# Patient Record
Sex: FEMALE | Race: WHITE | NOT HISPANIC OR LATINO | Employment: UNEMPLOYED | URBAN - NONMETROPOLITAN AREA
[De-identification: names, ages, dates, MRNs, and addresses within clinical notes are randomized per-mention and may not be internally consistent; named-entity substitution may affect disease eponyms.]

---

## 2024-09-12 ENCOUNTER — HOSPITAL ENCOUNTER (EMERGENCY)
Facility: HOSPITAL | Age: 29
Discharge: HOME | End: 2024-09-13
Attending: EMERGENCY MEDICINE
Payer: MEDICAID

## 2024-09-12 DIAGNOSIS — F10.920 ALCOHOLIC INTOXICATION WITHOUT COMPLICATION: Primary | ICD-10-CM

## 2024-09-12 LAB
AMPHET+METHAMPHET UR QL: POSITIVE
AMPHETAMINES UR QL: NEGATIVE
B-HCG UR QL: NEGATIVE
BARBITURATES UR QL SCN: NEGATIVE
BASOPHILS # BLD AUTO: 0.09 10*3/MM3 (ref 0–0.2)
BASOPHILS NFR BLD AUTO: 1.1 % (ref 0–1.5)
BENZODIAZ UR QL SCN: POSITIVE
BILIRUB UR QL STRIP: NEGATIVE
BUPRENORPHINE SERPL-MCNC: NEGATIVE NG/ML
CANNABINOIDS SERPL QL: NEGATIVE
CLARITY UR: CLEAR
COCAINE UR QL: NEGATIVE
COLOR UR: YELLOW
DEPRECATED RDW RBC AUTO: 56.8 FL (ref 37–54)
EOSINOPHIL # BLD AUTO: 0.1 10*3/MM3 (ref 0–0.4)
EOSINOPHIL NFR BLD AUTO: 1.2 % (ref 0.3–6.2)
ERYTHROCYTE [DISTWIDTH] IN BLOOD BY AUTOMATED COUNT: 14.7 % (ref 12.3–15.4)
GLUCOSE UR STRIP-MCNC: NEGATIVE MG/DL
HCT VFR BLD AUTO: 40.7 % (ref 34–46.6)
HGB BLD-MCNC: 13.5 G/DL (ref 12–15.9)
HGB UR QL STRIP.AUTO: NEGATIVE
IMM GRANULOCYTES # BLD AUTO: 0.02 10*3/MM3 (ref 0–0.05)
IMM GRANULOCYTES NFR BLD AUTO: 0.2 % (ref 0–0.5)
KETONES UR QL STRIP: NEGATIVE
LEUKOCYTE ESTERASE UR QL STRIP.AUTO: ABNORMAL
LYMPHOCYTES # BLD AUTO: 3.37 10*3/MM3 (ref 0.7–3.1)
LYMPHOCYTES NFR BLD AUTO: 40.2 % (ref 19.6–45.3)
MCH RBC QN AUTO: 34.4 PG (ref 26.6–33)
MCHC RBC AUTO-ENTMCNC: 33.2 G/DL (ref 31.5–35.7)
MCV RBC AUTO: 103.8 FL (ref 79–97)
METHADONE UR QL SCN: NEGATIVE
MONOCYTES # BLD AUTO: 0.64 10*3/MM3 (ref 0.1–0.9)
MONOCYTES NFR BLD AUTO: 7.6 % (ref 5–12)
NEUTROPHILS NFR BLD AUTO: 4.16 10*3/MM3 (ref 1.7–7)
NEUTROPHILS NFR BLD AUTO: 49.7 % (ref 42.7–76)
NITRITE UR QL STRIP: NEGATIVE
NRBC BLD AUTO-RTO: 0 /100 WBC (ref 0–0.2)
OPIATES UR QL: NEGATIVE
OXYCODONE UR QL SCN: NEGATIVE
PCP UR QL SCN: NEGATIVE
PH UR STRIP.AUTO: 6.5 [PH] (ref 5–8)
PLATELET # BLD AUTO: 442 10*3/MM3 (ref 140–450)
PMV BLD AUTO: 10 FL (ref 6–12)
PROT UR QL STRIP: NEGATIVE
RBC # BLD AUTO: 3.92 10*6/MM3 (ref 3.77–5.28)
SP GR UR STRIP: <=1.005 (ref 1–1.03)
TRICYCLICS UR QL SCN: NEGATIVE
UROBILINOGEN UR QL STRIP: ABNORMAL
WBC NRBC COR # BLD AUTO: 8.38 10*3/MM3 (ref 3.4–10.8)

## 2024-09-12 PROCEDURE — 81001 URINALYSIS AUTO W/SCOPE: CPT | Performed by: EMERGENCY MEDICINE

## 2024-09-12 PROCEDURE — 36415 COLL VENOUS BLD VENIPUNCTURE: CPT

## 2024-09-12 PROCEDURE — 80053 COMPREHEN METABOLIC PANEL: CPT | Performed by: EMERGENCY MEDICINE

## 2024-09-12 PROCEDURE — 80307 DRUG TEST PRSMV CHEM ANLYZR: CPT | Performed by: EMERGENCY MEDICINE

## 2024-09-12 PROCEDURE — 81025 URINE PREGNANCY TEST: CPT | Performed by: EMERGENCY MEDICINE

## 2024-09-12 PROCEDURE — 99283 EMERGENCY DEPT VISIT LOW MDM: CPT

## 2024-09-12 PROCEDURE — 82077 ASSAY SPEC XCP UR&BREATH IA: CPT | Performed by: EMERGENCY MEDICINE

## 2024-09-12 PROCEDURE — 85025 COMPLETE CBC W/AUTO DIFF WBC: CPT | Performed by: EMERGENCY MEDICINE

## 2024-09-13 VITALS
DIASTOLIC BLOOD PRESSURE: 124 MMHG | HEIGHT: 66 IN | SYSTOLIC BLOOD PRESSURE: 159 MMHG | WEIGHT: 242 LBS | TEMPERATURE: 97.8 F | OXYGEN SATURATION: 100 % | HEART RATE: 102 BPM | BODY MASS INDEX: 38.89 KG/M2 | RESPIRATION RATE: 18 BRPM

## 2024-09-13 LAB
ALBUMIN SERPL-MCNC: 4 G/DL (ref 3.5–5.2)
ALBUMIN/GLOB SERPL: 1.3 G/DL
ALP SERPL-CCNC: 92 U/L (ref 39–117)
ALT SERPL W P-5'-P-CCNC: 39 U/L (ref 1–33)
ANION GAP SERPL CALCULATED.3IONS-SCNC: 14.1 MMOL/L (ref 5–15)
AST SERPL-CCNC: 31 U/L (ref 1–32)
BACTERIA UR QL AUTO: ABNORMAL /HPF
BILIRUB SERPL-MCNC: 0.2 MG/DL (ref 0–1.2)
BUN SERPL-MCNC: 9 MG/DL (ref 6–20)
BUN/CREAT SERPL: 12.9 (ref 7–25)
CALCIUM SPEC-SCNC: 8.8 MG/DL (ref 8.6–10.5)
CHLORIDE SERPL-SCNC: 101 MMOL/L (ref 98–107)
CO2 SERPL-SCNC: 24.9 MMOL/L (ref 22–29)
CREAT SERPL-MCNC: 0.7 MG/DL (ref 0.57–1)
EGFRCR SERPLBLD CKD-EPI 2021: 121.7 ML/MIN/1.73
ETHANOL BLD-MCNC: 111 MG/DL (ref 0–10)
ETHANOL BLD-MCNC: 200 MG/DL (ref 0–10)
ETHANOL BLD-MCNC: 414 MG/DL (ref 0–10)
ETHANOL UR QL: 0.11 %
ETHANOL UR QL: 0.2 %
ETHANOL UR QL: 0.41 %
FENTANYL UR-MCNC: NEGATIVE NG/ML
GLOBULIN UR ELPH-MCNC: 3.2 GM/DL
GLUCOSE SERPL-MCNC: 114 MG/DL (ref 65–99)
HYALINE CASTS UR QL AUTO: ABNORMAL /LPF
POTASSIUM SERPL-SCNC: 3.4 MMOL/L (ref 3.5–5.2)
PROT SERPL-MCNC: 7.2 G/DL (ref 6–8.5)
RBC # UR STRIP: ABNORMAL /HPF
REF LAB TEST METHOD: ABNORMAL
SODIUM SERPL-SCNC: 140 MMOL/L (ref 136–145)
SQUAMOUS #/AREA URNS HPF: ABNORMAL /HPF
WBC # UR STRIP: ABNORMAL /HPF

## 2024-09-13 PROCEDURE — 82077 ASSAY SPEC XCP UR&BREATH IA: CPT | Performed by: EMERGENCY MEDICINE

## 2024-09-13 PROCEDURE — 63710000001 ONDANSETRON ODT 4 MG TABLET DISPERSIBLE: Performed by: STUDENT IN AN ORGANIZED HEALTH CARE EDUCATION/TRAINING PROGRAM

## 2024-09-13 PROCEDURE — 82077 ASSAY SPEC XCP UR&BREATH IA: CPT | Performed by: STUDENT IN AN ORGANIZED HEALTH CARE EDUCATION/TRAINING PROGRAM

## 2024-09-13 RX ORDER — TRAZODONE HYDROCHLORIDE 100 MG/1
100 TABLET ORAL
COMMUNITY

## 2024-09-13 RX ORDER — FLUOXETINE 40 MG/1
40 CAPSULE ORAL DAILY
COMMUNITY
Start: 2016-08-04 | End: 2025-02-12

## 2024-09-13 RX ORDER — DIAZEPAM 5 MG
10 TABLET ORAL ONCE
Status: COMPLETED | OUTPATIENT
Start: 2024-09-13 | End: 2024-09-13

## 2024-09-13 RX ORDER — LANOLIN ALCOHOL/MO/W.PET/CERES
1 CREAM (GRAM) TOPICAL DAILY
COMMUNITY
Start: 2024-08-28

## 2024-09-13 RX ORDER — FOLIC ACID 1 MG/1
1 TABLET ORAL DAILY
COMMUNITY
Start: 2024-08-29 | End: 2024-09-28

## 2024-09-13 RX ORDER — ACETAMINOPHEN 500 MG
500 TABLET ORAL ONCE
Status: COMPLETED | OUTPATIENT
Start: 2024-09-13 | End: 2024-09-13

## 2024-09-13 RX ORDER — ONDANSETRON 4 MG/1
4 TABLET, ORALLY DISINTEGRATING ORAL ONCE
Status: COMPLETED | OUTPATIENT
Start: 2024-09-13 | End: 2024-09-13

## 2024-09-13 RX ORDER — DIAZEPAM 5 MG
20 TABLET ORAL ONCE
Status: DISCONTINUED | OUTPATIENT
Start: 2024-09-13 | End: 2024-09-13

## 2024-09-13 RX ORDER — DIPHENOXYLATE HYDROCHLORIDE AND ATROPINE SULFATE 2.5; .025 MG/1; MG/1
1 TABLET ORAL DAILY
COMMUNITY
Start: 2024-08-29 | End: 2024-09-28

## 2024-09-13 RX ADMIN — ONDANSETRON 4 MG: 4 TABLET, ORALLY DISINTEGRATING ORAL at 10:03

## 2024-09-13 RX ADMIN — DIAZEPAM 10 MG: 5 TABLET ORAL at 05:02

## 2024-09-13 RX ADMIN — ACETAMINOPHEN 500 MG: 500 TABLET, FILM COATED ORAL at 07:59

## 2024-09-13 RX ADMIN — DIAZEPAM 10 MG: 5 TABLET ORAL at 01:13

## 2024-09-13 NOTE — CASE MANAGEMENT/SOCIAL WORK
Case Management/Social Work    Patient Name:  Joelle Luna  YOB: 1995  MRN: [id number]  Admit Date:  9/12/2024    1438:  Meme HOLLOWAY informed case management that patient has transportation need.    1442:  Patient is agreeable for [facility] transportation.  She acknowledged and signed the Transportation Waiver and Release form.    1448:  Initiated ride request via online [employer] portal.    1451:  Received approval for [facility] transportation via [employer].  Patient to await  in ED lobby.      Electronically signed by:  Aurea Salguero RN  09/13/24 15:04 EDT

## 2024-09-13 NOTE — ED PROVIDER NOTES
EMERGENCY DEPARTMENT ENCOUNTER    Pt Name: Joelle Luna  MRN: [id number]  Pt :   1995  Room Number:  CDU3/03  Date of encounter:  2024  PCP: Provider, No Known  ED Provider: Guanakito Hazel MD    Historian: Patient      HPI:  Chief Complaint: Requesting alcohol detox        Context: Joelle Luna is a 29 y.o. female who presents to the ED c/o questing alcohol detox.  Patient has a past history significant for alcohol abuse.  She says that she has been drinking for a long time.  She says that her last drink was about 2 hours ago.  She says she came to the hospital because she wants help with her alcohol abuse.  She denies any other complaints.      PAST MEDICAL HISTORY  History reviewed. No pertinent past medical history.      PAST SURGICAL HISTORY  History reviewed. No pertinent surgical history.      FAMILY HISTORY  History reviewed. No pertinent family history.      SOCIAL HISTORY  Social History     Socioeconomic History    Marital status: Single   Tobacco Use    Smoking status: Never    Smokeless tobacco: Never   Vaping Use    Vaping status: Every Day   Substance and Sexual Activity    Alcohol use: Yes     Alcohol/week: 4.0 standard drinks of alcohol     Types: 4 Cans of beer per week     Comment: 1/5th of liquor,    Drug use: Never         ALLERGIES  Patient has no known allergies.        REVIEW OF SYSTEMS    All systems reviewed and negative except for those discussed in HPI.       PHYSICAL EXAM    I have reviewed the triage vital signs and nursing notes.    ED Triage Vitals [24 2247]   Temp Heart Rate Resp BP SpO2   97.8 °F (36.6 °C) 104 18 124/85 100 %      Temp src Heart Rate Source Patient Position BP Location FiO2 (%)   Oral Monitor Sitting Left arm --         General: no acute distress, well-appearing, non-toxic  Skin: normal color, warm and dry  Head: normocephalic, atraumatic  Eyes: Pupils equally round and reactive to light.  Nose: normal nasal mucosa, no visible deformity.  Mouth:  moist mucous membranes.  Neck: supple.  Chest: no retractions, no visible deformity  Cardiovascular: Regular rate and rhythm.  Neuro:  alert and oriented x3, no focal neurological deficits.  Psych: No suicidal or homicidal ideation.        LAB RESULTS  Recent Results (from the past 24 hour(s))   Comprehensive Metabolic Panel    Collection Time: 09/12/24 11:36 PM    Specimen: Blood   Result Value Ref Range    Glucose 114 (H) 65 - 99 mg/dL    BUN 9 6 - 20 mg/dL    Creatinine 0.70 0.57 - 1.00 mg/dL    Sodium 140 136 - 145 mmol/L    Potassium 3.4 (L) 3.5 - 5.2 mmol/L    Chloride 101 98 - 107 mmol/L    CO2 24.9 22.0 - 29.0 mmol/L    Calcium 8.8 8.6 - 10.5 mg/dL    Total Protein 7.2 6.0 - 8.5 g/dL    Albumin 4.0 3.5 - 5.2 g/dL    ALT (SGPT) 39 (H) 1 - 33 U/L    AST (SGOT) 31 1 - 32 U/L    Alkaline Phosphatase 92 39 - 117 U/L    Total Bilirubin 0.2 0.0 - 1.2 mg/dL    Globulin 3.2 gm/dL    A/G Ratio 1.3 g/dL    BUN/Creatinine Ratio 12.9 7.0 - 25.0    Anion Gap 14.1 5.0 - 15.0 mmol/L    eGFR 121.7 >60.0 mL/min/1.73   CBC Auto Differential    Collection Time: 09/12/24 11:36 PM    Specimen: Blood   Result Value Ref Range    WBC 8.38 3.40 - 10.80 10*3/mm3    RBC 3.92 3.77 - 5.28 10*6/mm3    Hemoglobin 13.5 12.0 - 15.9 g/dL    Hematocrit 40.7 34.0 - 46.6 %    .8 (H) 79.0 - 97.0 fL    MCH 34.4 (H) 26.6 - 33.0 pg    MCHC 33.2 31.5 - 35.7 g/dL    RDW 14.7 12.3 - 15.4 %    RDW-SD 56.8 (H) 37.0 - 54.0 fl    MPV 10.0 6.0 - 12.0 fL    Platelets 442 140 - 450 10*3/mm3    Neutrophil % 49.7 42.7 - 76.0 %    Lymphocyte % 40.2 19.6 - 45.3 %    Monocyte % 7.6 5.0 - 12.0 %    Eosinophil % 1.2 0.3 - 6.2 %    Basophil % 1.1 0.0 - 1.5 %    Immature Grans % 0.2 0.0 - 0.5 %    Neutrophils, Absolute 4.16 1.70 - 7.00 10*3/mm3    Lymphocytes, Absolute 3.37 (H) 0.70 - 3.10 10*3/mm3    Monocytes, Absolute 0.64 0.10 - 0.90 10*3/mm3    Eosinophils, Absolute 0.10 0.00 - 0.40 10*3/mm3    Basophils, Absolute 0.09 0.00 - 0.20 10*3/mm3    Immature  Grans, Absolute 0.02 0.00 - 0.05 10*3/mm3    nRBC 0.0 0.0 - 0.2 /100 WBC   Ethanol    Collection Time: 09/12/24 11:36 PM    Specimen: Blood   Result Value Ref Range    Ethanol 414 (H) 0 - 10 mg/dL    Ethanol % 0.414 %   Urine Drug Screen - Urine, Clean Catch    Collection Time: 09/12/24 11:37 PM    Specimen: Urine, Clean Catch   Result Value Ref Range    THC, Screen, Urine Negative Negative    Phencyclidine (PCP), Urine Negative Negative    Cocaine Screen, Urine Negative Negative    Methamphetamine, Ur Negative Negative    Opiate Screen Negative Negative    Amphetamine Screen, Urine Positive (A) Negative    Benzodiazepine Screen, Urine Positive (A) Negative    Tricyclic Antidepressants Screen Negative Negative    Methadone Screen, Urine Negative Negative    Barbiturates Screen, Urine Negative Negative    Oxycodone Screen, Urine Negative Negative    Buprenorphine, Screen, Urine Negative Negative   Urinalysis With Microscopic If Indicated (No Culture) - Urine, Clean Catch    Collection Time: 09/12/24 11:37 PM    Specimen: Urine, Clean Catch   Result Value Ref Range    Color, UA Yellow Yellow, Straw    Appearance, UA Clear Clear    pH, UA 6.5 5.0 - 8.0    Specific Gravity, UA <=1.005 1.005 - 1.030    Glucose, UA Negative Negative    Ketones, UA Negative Negative    Bilirubin, UA Negative Negative    Blood, UA Negative Negative    Protein, UA Negative Negative    Leuk Esterase, UA Trace (A) Negative    Nitrite, UA Negative Negative    Urobilinogen, UA 0.2 E.U./dL 0.2 - 1.0 E.U./dL   Pregnancy, Urine - Urine, Clean Catch    Collection Time: 09/12/24 11:37 PM    Specimen: Urine, Clean Catch   Result Value Ref Range    HCG, Urine QL Negative Negative   Fentanyl, Urine - Urine, Clean Catch    Collection Time: 09/12/24 11:37 PM    Specimen: Urine, Clean Catch   Result Value Ref Range    Fentanyl, Urine Negative Negative   Urinalysis, Microscopic Only - Urine, Clean Catch    Collection Time: 09/12/24 11:37 PM    Specimen:  Urine, Clean Catch   Result Value Ref Range    RBC, UA None Seen None Seen, 0-2 /HPF    WBC, UA 0-2 None Seen, 0-2 /HPF    Bacteria, UA Trace (A) None Seen /HPF    Squamous Epithelial Cells, UA None Seen None Seen, 0-2 /HPF    Hyaline Casts, UA None Seen None Seen /LPF    Methodology Manual Light Microscopy        If labs were ordered, I independently reviewed the results and considered them in treating the patient.  See medical decision making discussion section for my interpretation of lab results.        RADIOLOGY  No Radiology Exams Resulted Within Past 24 Hours    PROCEDURES    Procedures    No orders to display       MEDICATIONS GIVEN IN ER    Medications   diazePAM (VALIUM) tablet 10 mg (10 mg Oral Given 9/13/24 0113)   diazePAM (VALIUM) tablet 10 mg (10 mg Oral Given 9/13/24 0502)         MEDICAL DECISION MAKING, PROGRESS, and CONSULTS    All labs, if obtained, have been independently reviewed by me.  All radiology studies, if obtained, have been reviewed by me and the radiologist dictating the report.  All EKG's, if obtained, have been independently viewed and interpreted by me/my attending physician.      Discussion below represents my analysis of pertinent findings related to patient's condition, differential diagnosis, treatment plan and final disposition.                         Differential diagnosis:    Differential includes hypoglycemia, alcohol intoxication, alcohol withdrawal, other acute emergency.    Medical Decision Making Discussion:    Vitals reviewed and demonstrate slight tachycardia but otherwise are normal.    Labs reviewed and show alcohol intoxication with initial alcohol level of 414.  Labs otherwise demonstrate mild hypokalemia.  UA negative for infection.  Pregnancy test negative.    While emergency department patient began reporting withdrawal symptoms.  Initial CIWA is 8.  She was given 10 mg of p.o. Valium with improvement in her symptoms.  Several hours later she again began  reporting concerns for withdrawal symptoms.  She was given an additional dose of 10 mg of p.o. Valium.    Patient will be thought to have an alcohol level of less than 100 around 9 AM at which time she can be evaluated by behavioral health for her request for alcohol detox.  Pending repeat alcohol level and behavioral health evaluation for alcohol detox patient care transferred to Dr. Mejia at time of shift change.      Additional sources:    - External (non-ED) record review: History and physical from August 2024 documenting past history of alcohol abuse, hypertension and PCOS.  Hospitalized for alcohol withdrawal.    Reviewed discharge summary from August 28, 2024.  Documented concerns for cirrhosis. Arrangements made for alcohol rehab on an outpatient basis.    - Chronic or social conditions impacting care: Alcohol abuse    Shared Decision Making:  After my consideration of clinical presentation and any laboratory/radiology studies obtained, I discussed the findings with the patient/patient representative who is in agreement with the treatment plan and the final disposition.   Risks and benefits of discharge and/or observation/admission were discussed.    Orders placed during this visit:  Orders Placed This Encounter   Procedures    Comprehensive Metabolic Panel    CBC Auto Differential    Ethanol    Urine Drug Screen - Urine, Clean Catch    Urinalysis With Microscopic If Indicated (No Culture) - Urine, Clean Catch    Pregnancy, Urine - Urine, Clean Catch    Fentanyl, Urine - Urine, Clean Catch    Urinalysis, Microscopic Only - Urine, Clean Catch    Ethanol    Clinical [place] Withdrawal Assessment       AS OF 06:36 EDT VITALS:    BP - 117/70  HR - 102  TEMP - 97.8 °F (36.6 °C) (Oral)  O2 SATS - 95%                  DIAGNOSIS  Final diagnoses:   Alcohol abuse   Alcoholic intoxication with complication         DISPOSITION  Pending      Please note that portions of this document were completed with voice  recognition software.        Guanakito Hazel MD  09/13/24 0605

## 2024-09-13 NOTE — CONSULTS
This therapist received call from [facility] staff JEWEL Mckeon for a behavioral health consult. Met with patient at bedside. Patient presents to ED for alcohol intoxication. Patient does serve as her own guardian. Patient is alert and oriented to person, place, time, and situation. Patient mood is neutral. There no signs of hallucinations or delusions. At this time, the patient is not agreeable to full behavioral health assessment or consultation. Patient reports she is not interested in inpatient detox or rehab. Patient denies withdrawal symptoms at this time and reports she plans to follow up with outpatient treatment. The patient does not present with criteria to warrant an involuntary petition or psychiatric hold at this time as (he/she/they) is not actively suicidal, homicidal, or displaying symptoms of an acute psychotic episode. Therapist offered resources for outpatient mental health providers and supprot in the area.  Therapist also discussed the availability of emergency behavioral health services 24/7 at through the [facility] and [facility].  Therapist assisted the patient in identifying risk factors that would indicate the need for higher level of care, such as acute withdrawal symptoms, thoughts to harm self or others and/or self-harming behavior(s). Encouraged patient to call 911, crisis hotlines, or present to the nearest emergency department should symptoms worsen, or in any crisis/emergency. The patient is agreeable and voiced understanding.    [place]-SUICIDE SEVERITY RATING SCALE  Psychiatric Inpatient Setting - Discharge Screener    Ask questions that are bold and underlined Discharge   Ask Questions 1 and 2 YES NO   Wish to be Dead:   Person endorses thoughts about a wish to be dead or not alive anymore, or wish to fall asleep and not wake up.  While you were here in the hospital, have you wished you were dead or wished you could go to sleep and not wake up?   x   Suicidal Thoughts:   General non-specific thoughts of wanting to end one's life/die by suicide, “I've thought about killing myself” without general thoughts of ways to kill oneself/associated methods, intent, or plan.   While you were here in the hospital, have you actually had thoughts about killing yourself?   x   If YES to 2, ask questions 3, 4, 5, and 6.  If NO to 2, go directly to question 6   3) Suicidal Thoughts with Method (without Specific Plan or Intent to Act):   Person endorses thoughts of suicide and has thought of a least one method during the assessment period. This is different than a specific plan with time, place or method details worked out. “I thought about taking an overdose but I never made a specific plan as to when where or how I would actually do it….and I would never go through with it.”   Have you been thinking about how you might kill yourself?      4) Suicidal Intent (without Specific Plan):   Active suicidal thoughts of killing oneself and patient reports having some intent to act on such thoughts, as opposed to “I have the thoughts but I definitely will not do anything about them.”   Have you had these thoughts and had some intention of acting on them or do you have some intention of acting on them after you leave the hospital?      5) Suicide Intent with Specific Plan:   Thoughts of killing oneself with details of plan fully or partially worked out and person has some intent to carry it out.   Have you started to work out or worked out the details of how to kill yourself either for while you were here in the hospital or for after you leave the hospital? Do you intend to carry out this plan?        6) Suicide Behavior    While you were here in the hospital, have you done anything, started to do anything, or prepared to do anything to end your life?    Examples: Took pills, cut yourself, tried to hang yourself, took out pills but didn't swallow any because you changed your mind or  someone took them from you, collected pills, secured a means of obtaining a gun, gave away valuables, wrote a will or suicide note, etc.  x     Katarina Mcnair, CSW, MSW

## 2024-09-13 NOTE — ED NOTES
Called nutritional services att requesting tray per order. Gabriela stated they would send tray down in a few minutes.

## 2024-09-13 NOTE — ED PROVIDER NOTES
Patient turned over to me pending behavioral health evaluation and clinical sobriety.  Patient is a 29-year-old female that presented for acute alcohol intoxication and request for alcohol detox.  She was initially tachycardic on presentation with otherwise reassuring vital signs.  EtOH was 0.414 on initial lab draw.  Patient was requesting alcohol detox and patient was observed for several hours for clinical sobriety.  After over 12 hours her ethanol was close to 0.1%.  She was asymptomatic on evaluation.  She was seen by behavioral health and ultimately felt comfortable with outpatient management she plans to attend an intensive outpatient program.  No SI HI or AVH or other complicating factors.  Discussed return precautions with the patient.  Stable for discharge as per request.     Kip Mejia MD  09/13/24 [id number]

## 2024-11-24 ENCOUNTER — HOSPITAL ENCOUNTER (EMERGENCY)
Facility: HOSPITAL | Age: 29
Discharge: HOME | End: 2024-11-24
Attending: STUDENT IN AN ORGANIZED HEALTH CARE EDUCATION/TRAINING PROGRAM | Admitting: STUDENT IN AN ORGANIZED HEALTH CARE EDUCATION/TRAINING PROGRAM
Payer: MEDICAID

## 2024-11-24 VITALS
HEART RATE: 118 BPM | OXYGEN SATURATION: 99 % | HEIGHT: 66 IN | RESPIRATION RATE: 18 BRPM | SYSTOLIC BLOOD PRESSURE: 129 MMHG | WEIGHT: 240 LBS | DIASTOLIC BLOOD PRESSURE: 85 MMHG | TEMPERATURE: 99.8 F | BODY MASS INDEX: 38.57 KG/M2

## 2024-11-24 DIAGNOSIS — F10.90 ALCOHOL USE DISORDER: Primary | ICD-10-CM

## 2024-11-24 LAB
ALBUMIN SERPL-MCNC: 3.6 G/DL (ref 3.5–5.2)
ALBUMIN SERPL-MCNC: 3.9 G/DL (ref 3.5–5.2)
ALBUMIN/GLOB SERPL: 1.4 G/DL
ALBUMIN/GLOB SERPL: 1.6 G/DL
ALP SERPL-CCNC: 56 U/L (ref 39–117)
ALP SERPL-CCNC: 65 U/L (ref 39–117)
ALT SERPL W P-5'-P-CCNC: 29 U/L (ref 1–33)
ALT SERPL W P-5'-P-CCNC: 34 U/L (ref 1–33)
AMPHET+METHAMPHET UR QL: POSITIVE
AMPHETAMINES UR QL: NEGATIVE
ANION GAP SERPL CALCULATED.3IONS-SCNC: 14.2 MMOL/L (ref 5–15)
ANION GAP SERPL CALCULATED.3IONS-SCNC: 22.9 MMOL/L (ref 5–15)
APAP SERPL-MCNC: <5 MCG/ML (ref 0–30)
AST SERPL-CCNC: 40 U/L (ref 1–32)
AST SERPL-CCNC: 50 U/L (ref 1–32)
ATMOSPHERIC PRESS: 733 MMHG
B-HCG UR QL: NEGATIVE
BARBITURATES UR QL SCN: NEGATIVE
BASE EXCESS BLDV CALC-SCNC: -0.9 MMOL/L (ref 0–2)
BASOPHILS # BLD AUTO: 0.09 10*3/MM3 (ref 0–0.2)
BASOPHILS NFR BLD AUTO: 1.1 % (ref 0–1.5)
BDY SITE: ABNORMAL
BENZODIAZ UR QL SCN: POSITIVE
BILIRUB SERPL-MCNC: 0.9 MG/DL (ref 0–1.2)
BILIRUB SERPL-MCNC: 1 MG/DL (ref 0–1.2)
BILIRUB UR QL STRIP: NEGATIVE
BUN SERPL-MCNC: 4 MG/DL (ref 6–20)
BUN SERPL-MCNC: 5 MG/DL (ref 6–20)
BUN/CREAT SERPL: 6.8 (ref 7–25)
BUN/CREAT SERPL: 7.2 (ref 7–25)
BUPRENORPHINE SERPL-MCNC: NEGATIVE NG/ML
CALCIUM SPEC-SCNC: 7.2 MG/DL (ref 8.6–10.5)
CALCIUM SPEC-SCNC: 8.2 MG/DL (ref 8.6–10.5)
CANNABINOIDS SERPL QL: NEGATIVE
CHLORIDE SERPL-SCNC: 93 MMOL/L (ref 98–107)
CHLORIDE SERPL-SCNC: 96 MMOL/L (ref 98–107)
CLARITY UR: CLEAR
CO2 SERPL-SCNC: 16.1 MMOL/L (ref 22–29)
CO2 SERPL-SCNC: 18.8 MMOL/L (ref 22–29)
COCAINE UR QL: NEGATIVE
COHGB MFR BLD: 1.2 % (ref 0–5)
COLOR UR: YELLOW
CREAT SERPL-MCNC: 0.59 MG/DL (ref 0.57–1)
CREAT SERPL-MCNC: 0.69 MG/DL (ref 0.57–1)
DEPRECATED RDW RBC AUTO: 46.6 FL (ref 37–54)
EGFRCR SERPLBLD CKD-EPI 2021: 122.2 ML/MIN/1.73
EGFRCR SERPLBLD CKD-EPI 2021: 126.9 ML/MIN/1.73
EOSINOPHIL # BLD AUTO: 0.01 10*3/MM3 (ref 0–0.4)
EOSINOPHIL NFR BLD AUTO: 0.1 % (ref 0.3–6.2)
ERYTHROCYTE [DISTWIDTH] IN BLOOD BY AUTOMATED COUNT: 13.1 % (ref 12.3–15.4)
ETHANOL BLD-MCNC: <10 MG/DL (ref 0–10)
ETHANOL UR QL: <0.01 %
FENTANYL UR-MCNC: NEGATIVE NG/ML
GLOBULIN UR ELPH-MCNC: 2.3 GM/DL
GLOBULIN UR ELPH-MCNC: 2.8 GM/DL
GLUCOSE SERPL-MCNC: 118 MG/DL (ref 65–99)
GLUCOSE SERPL-MCNC: 337 MG/DL (ref 65–99)
GLUCOSE UR STRIP-MCNC: NEGATIVE MG/DL
HCO3 BLDV-SCNC: 22.5 MMOL/L (ref 22–28)
HCT VFR BLD AUTO: 41.4 % (ref 34–46.6)
HGB BLD-MCNC: 14.3 G/DL (ref 12–15.9)
HGB UR QL STRIP.AUTO: NEGATIVE
IMM GRANULOCYTES # BLD AUTO: 0.02 10*3/MM3 (ref 0–0.05)
IMM GRANULOCYTES NFR BLD AUTO: 0.3 % (ref 0–0.5)
KETONES UR QL STRIP: ABNORMAL
LEUKOCYTE ESTERASE UR QL STRIP.AUTO: NEGATIVE
LYMPHOCYTES # BLD AUTO: 1.72 10*3/MM3 (ref 0.7–3.1)
LYMPHOCYTES NFR BLD AUTO: 21.7 % (ref 19.6–45.3)
Lab: ABNORMAL
MAGNESIUM SERPL-MCNC: 1.2 MG/DL (ref 1.6–2.6)
MCH RBC QN AUTO: 33.3 PG (ref 26.6–33)
MCHC RBC AUTO-ENTMCNC: 34.5 G/DL (ref 31.5–35.7)
MCV RBC AUTO: 96.3 FL (ref 79–97)
METHADONE UR QL SCN: NEGATIVE
METHGB BLD QL: 0.6 % (ref 0–3)
MODALITY: ABNORMAL
MONOCYTES # BLD AUTO: 0.57 10*3/MM3 (ref 0.1–0.9)
MONOCYTES NFR BLD AUTO: 7.2 % (ref 5–12)
NEUTROPHILS NFR BLD AUTO: 5.51 10*3/MM3 (ref 1.7–7)
NEUTROPHILS NFR BLD AUTO: 69.6 % (ref 42.7–76)
NITRITE UR QL STRIP: NEGATIVE
NRBC BLD AUTO-RTO: 0 /100 WBC (ref 0–0.2)
OPIATES UR QL: NEGATIVE
OXYCODONE UR QL SCN: NEGATIVE
OXYHGB MFR BLDV: 60.9 % (ref 40–70)
PCO2 BLDV: 33 MM HG (ref 40–50)
PCP UR QL SCN: NEGATIVE
PH BLDV: 7.44 PH UNITS (ref 7.32–7.42)
PH UR STRIP.AUTO: 6 [PH] (ref 5–8)
PLATELET # BLD AUTO: 440 10*3/MM3 (ref 140–450)
PMV BLD AUTO: 9 FL (ref 6–12)
PO2 BLDV: 32.6 MM HG (ref 30–50)
POTASSIUM SERPL-SCNC: 3.4 MMOL/L (ref 3.5–5.2)
POTASSIUM SERPL-SCNC: 3.9 MMOL/L (ref 3.5–5.2)
PROT SERPL-MCNC: 5.9 G/DL (ref 6–8.5)
PROT SERPL-MCNC: 6.7 G/DL (ref 6–8.5)
PROT UR QL STRIP: NEGATIVE
RBC # BLD AUTO: 4.3 10*6/MM3 (ref 3.77–5.28)
SALICYLATES SERPL-MCNC: <0.3 MG/DL
SAO2 % BLDCOV: 62.1 % (ref 45–75)
SODIUM SERPL-SCNC: 129 MMOL/L (ref 136–145)
SODIUM SERPL-SCNC: 132 MMOL/L (ref 136–145)
SP GR UR STRIP: 1.02 (ref 1–1.03)
TRICYCLICS UR QL SCN: NEGATIVE
UROBILINOGEN UR QL STRIP: ABNORMAL
VENTILATOR MODE: ABNORMAL
WBC NRBC COR # BLD AUTO: 7.92 10*3/MM3 (ref 3.4–10.8)

## 2024-11-24 PROCEDURE — 80053 COMPREHEN METABOLIC PANEL: CPT | Performed by: PHYSICIAN ASSISTANT

## 2024-11-24 PROCEDURE — 96375 TX/PRO/DX INJ NEW DRUG ADDON: CPT

## 2024-11-24 PROCEDURE — 81003 URINALYSIS AUTO W/O SCOPE: CPT | Performed by: PHYSICIAN ASSISTANT

## 2024-11-24 PROCEDURE — 82077 ASSAY SPEC XCP UR&BREATH IA: CPT | Performed by: PHYSICIAN ASSISTANT

## 2024-11-24 PROCEDURE — 82820 HEMOGLOBIN-OXYGEN AFFINITY: CPT

## 2024-11-24 PROCEDURE — 96365 THER/PROPH/DIAG IV INF INIT: CPT

## 2024-11-24 PROCEDURE — 80143 DRUG ASSAY ACETAMINOPHEN: CPT | Performed by: PHYSICIAN ASSISTANT

## 2024-11-24 PROCEDURE — 25810000003 SODIUM CHLORIDE 0.9 % SOLUTION: Performed by: PHYSICIAN ASSISTANT

## 2024-11-24 PROCEDURE — 83735 ASSAY OF MAGNESIUM: CPT | Performed by: PHYSICIAN ASSISTANT

## 2024-11-24 PROCEDURE — 80053 COMPREHEN METABOLIC PANEL: CPT | Performed by: STUDENT IN AN ORGANIZED HEALTH CARE EDUCATION/TRAINING PROGRAM

## 2024-11-24 PROCEDURE — 93005 ELECTROCARDIOGRAM TRACING: CPT | Performed by: PHYSICIAN ASSISTANT

## 2024-11-24 PROCEDURE — 25010000002 DIAZEPAM PER 5 MG: Performed by: STUDENT IN AN ORGANIZED HEALTH CARE EDUCATION/TRAINING PROGRAM

## 2024-11-24 PROCEDURE — 99283 EMERGENCY DEPT VISIT LOW MDM: CPT | Performed by: STUDENT IN AN ORGANIZED HEALTH CARE EDUCATION/TRAINING PROGRAM

## 2024-11-24 PROCEDURE — 25810000003 DEXTROSE 5 % AND SODIUM CHLORIDE 0.9 % 5-0.9 % SOLUTION: Performed by: PHYSICIAN ASSISTANT

## 2024-11-24 PROCEDURE — 80307 DRUG TEST PRSMV CHEM ANLYZR: CPT | Performed by: PHYSICIAN ASSISTANT

## 2024-11-24 PROCEDURE — 81025 URINE PREGNANCY TEST: CPT | Performed by: PHYSICIAN ASSISTANT

## 2024-11-24 PROCEDURE — 85025 COMPLETE CBC W/AUTO DIFF WBC: CPT | Performed by: PHYSICIAN ASSISTANT

## 2024-11-24 PROCEDURE — 80179 DRUG ASSAY SALICYLATE: CPT | Performed by: PHYSICIAN ASSISTANT

## 2024-11-24 PROCEDURE — 25010000002 ONDANSETRON PER 1 MG: Performed by: PHYSICIAN ASSISTANT

## 2024-11-24 PROCEDURE — 82805 BLOOD GASES W/O2 SATURATION: CPT

## 2024-11-24 RX ORDER — ONDANSETRON 2 MG/ML
4 INJECTION INTRAMUSCULAR; INTRAVENOUS ONCE
Status: COMPLETED | OUTPATIENT
Start: 2024-11-24 | End: 2024-11-24

## 2024-11-24 RX ORDER — CHLORDIAZEPOXIDE HYDROCHLORIDE 25 MG/1
25 CAPSULE, GELATIN COATED ORAL ONCE
Status: COMPLETED | OUTPATIENT
Start: 2024-11-24 | End: 2024-11-24

## 2024-11-24 RX ORDER — CHLORDIAZEPOXIDE HYDROCHLORIDE 25 MG/1
CAPSULE, GELATIN COATED ORAL
Qty: 15 CAPSULE | Refills: 0 | Status: SHIPPED | OUTPATIENT
Start: 2024-11-24

## 2024-11-24 RX ORDER — DEXTROSE MONOHYDRATE AND SODIUM CHLORIDE 5; .9 G/100ML; G/100ML
1000 INJECTION, SOLUTION INTRAVENOUS ONCE
Status: COMPLETED | OUTPATIENT
Start: 2024-11-24 | End: 2024-11-24

## 2024-11-24 RX ORDER — DIAZEPAM 10 MG/2ML
5 INJECTION, SOLUTION INTRAMUSCULAR; INTRAVENOUS ONCE
Status: COMPLETED | OUTPATIENT
Start: 2024-11-24 | End: 2024-11-24

## 2024-11-24 RX ORDER — SODIUM CHLORIDE 0.9 % (FLUSH) 0.9 %
10 SYRINGE (ML) INJECTION AS NEEDED
Status: DISCONTINUED | OUTPATIENT
Start: 2024-11-24 | End: 2024-11-25 | Stop reason: HOSPADM

## 2024-11-24 RX ADMIN — CHLORDIAZEPOXIDE HYDROCHLORIDE 25 MG: 25 CAPSULE ORAL at 21:43

## 2024-11-24 RX ADMIN — DIAZEPAM 5 MG: 5 INJECTION, SOLUTION INTRAMUSCULAR; INTRAVENOUS at 17:20

## 2024-11-24 RX ADMIN — SODIUM CHLORIDE 1000 ML: 9 INJECTION, SOLUTION INTRAVENOUS at 17:20

## 2024-11-24 RX ADMIN — ONDANSETRON 4 MG: 2 INJECTION INTRAMUSCULAR; INTRAVENOUS at 19:29

## 2024-11-24 RX ADMIN — DEXTROSE AND SODIUM CHLORIDE 1000 ML/HR: 5; 900 INJECTION, SOLUTION INTRAVENOUS at 18:58

## 2024-11-24 NOTE — ED PROVIDER NOTES
EMERGENCY DEPARTMENT ENCOUNTER    Pt Name: Joelle Luna  MRN: [id number]  Pt :   1995  Room Number:  CDU3/03  Date of encounter:  2024  PCP: Julianna Hale MD  ED Provider: Shyam Dunham PA-C    Historian: Patient      HPI:  Chief Complaint   Patient presents with    Alcohol Intoxication     Pt withdrawing from alcohol. Has been drinking about a fifth a day recently. Currently 6 hours sober. Pt complains of being really tired but afraid to go to sleep due to the thought of having a seizure. Denies pain att.                Context: Joelle Luna is a 29 y.o. female who presents to the ED c/o alcohol withdrawal symptoms.  Patient states usually drinks about half of a pint of whiskey a day however the past 4 days has drank 1/5 of whiskey.  Last drink of whiskey was 12 hours ago and over the past 12 hours has been sipping on a twisted iced tea in order to try to detox herself from alcohol.  Complains of feeling dizzy and shaky and afraid she is going to have a seizure.  Has never had a withdrawal seizure.  Had a little bit of chest discomfort prior to coming in in the ambulance but attributes this to anxiety.  No pain currently.  No hematemesis melena or hematochezia.  No abdominal pain.  Has been compliant with her antidepressants, antihypertensives and vitamin medications at home.  Of note was at [facility] yesterday but left due to being unhappy with the care she received there.    PAST MEDICAL HISTORY  History reviewed. No pertinent past medical history.      PAST SURGICAL HISTORY  History reviewed. No pertinent surgical history.      FAMILY HISTORY  History reviewed. No pertinent family history.      SOCIAL HISTORY  Social History     Socioeconomic History    Marital status: Single   Tobacco Use    Smoking status: Never    Smokeless tobacco: Never   Vaping Use    Vaping status: Every Day   Substance and Sexual Activity    Alcohol use: Yes     Alcohol/week: 4.0  "standard drinks of alcohol     Types: 4 Cans of beer per week     Comment: 1/5th of liquor,    Drug use: Never         ALLERGIES  Patient has no known allergies.        REVIEW OF SYSTEMS  Review of Systems   Constitutional: Negative.    HENT: Negative.     Eyes: Negative.    Respiratory: Negative.     Cardiovascular: Negative.    Gastrointestinal: Negative.  Negative for abdominal pain.   Genitourinary: Negative.    Musculoskeletal: Negative.    Skin: Negative.    Neurological:         \"Feeling shaky\" dizziness   Psychiatric/Behavioral:          Anxiety        All systems reviewed and negative except for those discussed in HPI.       PHYSICAL EXAM    I have reviewed the triage vital signs and nursing notes.    ED Triage Vitals [11/24/24 1711]   Temp Heart Rate Resp BP SpO2   99.8 °F (37.7 °C) (!) 128 15 119/67 98 %      Temp src Heart Rate Source Patient Position BP Location FiO2 (%)   Oral Monitor Lying Left arm --       Physical Exam  Vitals and nursing note reviewed.   Constitutional:       General: She is not in acute distress.     Appearance: Normal appearance. She is obese. She is not ill-appearing, toxic-appearing or diaphoretic.   HENT:      Head: Normocephalic and atraumatic.      Nose: Nose normal.   Eyes:      Extraocular Movements: Extraocular movements intact.   Cardiovascular:      Rate and Rhythm: Regular rhythm. Tachycardia present.      Heart sounds: Normal heart sounds.   Pulmonary:      Effort: Pulmonary effort is normal.   Abdominal:      General: Abdomen is flat. There is no distension.      Palpations: Abdomen is soft.      Tenderness: There is no abdominal tenderness. There is no guarding or rebound.   Musculoskeletal:         General: Normal range of motion.      Cervical back: Normal range of motion.   Skin:     General: Skin is warm and dry.   Neurological:      General: No focal deficit present.      Mental Status: She is alert. Mental status is at baseline.   Psychiatric:         Mood and " Affect: Mood normal.         Behavior: Behavior normal.            LAB RESULTS  Recent Results (from the past 24 hours)   POCT glucose meter    Collection Time: 11/23/24 10:45 PM    Specimen: Blood    Specimen type and source: Blood, Capillary blood specimen (specimen)   Result Value Ref Range    Glucose 102 (H) 74 - 99 mg/dL    Comment      POC-'S ID Tito Paniagua     Device [id number]     BG Specimen Type Capillary    Hepatitis C Antibody - ED    Collection Time: 11/24/24 12:00 AM    Specimen: Blood, Venous Line   Result Value Ref Range    HCV Qual Negative Negative   PHOSPHORUS    Collection Time: 11/24/24 12:00 AM    Specimen: Blood, Venous Line   Result Value Ref Range    Phosphorus 2.7 2.5 - 4.5 mg/dL   MAGNESIUM    Collection Time: 11/24/24 12:00 AM    Specimen: Blood, Venous Line   Result Value Ref Range    Magnesium 1.8 (L) 1.9 - 2.4 mg/dL   CBC AND DIFFERENTIAL    Collection Time: 11/24/24 12:00 AM    Specimen: Blood, Venous Line   Result Value Ref Range    WBC 5.29 3.70 - 10.30 10*3/uL    RBC 4.81 3.90 - 5.20 10*6/uL    Hemoglobin 16 (H) 11.2 - 15.7 g/dL    Hematocrit 46.9 (H) 34.0 - 45.0 %    Platelets 479 (H) 155 - 369 10*3/uL    MCV 98 79 - 98 fL    MCH 33.3 (H) 26.0 - 32.0 pg    MCHC 34.1 30.7 - 35.5 g/dL    RDW 13.2 11.5 - 14.5 %    MPV 9 8.8 - 12.5 fL    nRBC 0 <=0.0 per 100 WBCs    Differential Type Automated     Neutrophil Rel % 28 %    Lymphocyte Rel % 59 %    Monocyte Rel % 10 %    Eosinophil % 1 %    Basophil Rel % 2 %    Immature Grans % 0 %    Neutrophils Absolute 1.45 (L) 1.60 - 6.10 10*3/uL    Lymphocytes Absolute 3.15 1.20 - 3.90 10*3/uL    Monocytes Absolute 0.51 0.30 - 0.90 10*3/uL    Eosinophils Absolute 0.07 0.00 - 0.50 10*3/uL    Basophils Absolute 0.1 0.00 - 0.10 10*3/uL    Immature Grans, Absolute 0.01 0.00 - 0.06 10*3/uL   Comprehensive Metabolic Panel    Collection Time: 11/24/24  5:15 PM    Specimen: Blood   Result Value Ref Range    Glucose 118 (H) 65 - 99 mg/dL     BUN 5 (L) 6 - 20 mg/dL    Creatinine 0.69 0.57 - 1.00 mg/dL    Sodium 132 (L) 136 - 145 mmol/L    Potassium 3.4 (L) 3.5 - 5.2 mmol/L    Chloride 93 (L) 98 - 107 mmol/L    CO2 16.1 (L) 22.0 - 29.0 mmol/L    Calcium 8.2 (L) 8.6 - 10.5 mg/dL    Total Protein 6.7 6.0 - 8.5 g/dL    Albumin 3.9 3.5 - 5.2 g/dL    ALT (SGPT) 34 (H) 1 - 33 U/L    AST (SGOT) 50 (H) 1 - 32 U/L    Alkaline Phosphatase 65 39 - 117 U/L    Total Bilirubin 1.0 0.0 - 1.2 mg/dL    Globulin 2.8 gm/dL    A/G Ratio 1.4 g/dL    BUN/Creatinine Ratio 7.2 7.0 - 25.0    Anion Gap 22.9 (H) 5.0 - 15.0 mmol/L    eGFR 122.2 >60.0 mL/min/1.73   Acetaminophen Level    Collection Time: 11/24/24  5:15 PM    Specimen: Blood   Result Value Ref Range    Acetaminophen <5.0 0.0 - 30.0 mcg/mL   Ethanol    Collection Time: 11/24/24  5:15 PM    Specimen: Blood   Result Value Ref Range    Ethanol <10 0 - 10 mg/dL    Ethanol % <0.010 %   Salicylate Level    Collection Time: 11/24/24  5:15 PM    Specimen: Blood   Result Value Ref Range    Salicylate <0.3 <=30.0 mg/dL   Magnesium    Collection Time: 11/24/24  5:15 PM    Specimen: Blood   Result Value Ref Range    Magnesium 1.2 (L) 1.6 - 2.6 mg/dL   CBC Auto Differential    Collection Time: 11/24/24  5:15 PM    Specimen: Blood   Result Value Ref Range    WBC 7.92 3.40 - 10.80 10*3/mm3    RBC 4.30 3.77 - 5.28 10*6/mm3    Hemoglobin 14.3 12.0 - 15.9 g/dL    Hematocrit 41.4 34.0 - 46.6 %    MCV 96.3 79.0 - 97.0 fL    MCH 33.3 (H) 26.6 - 33.0 pg    MCHC 34.5 31.5 - 35.7 g/dL    RDW 13.1 12.3 - 15.4 %    RDW-SD 46.6 37.0 - 54.0 fl    MPV 9.0 6.0 - 12.0 fL    Platelets 440 140 - 450 10*3/mm3    Neutrophil % 69.6 42.7 - 76.0 %    Lymphocyte % 21.7 19.6 - 45.3 %    Monocyte % 7.2 5.0 - 12.0 %    Eosinophil % 0.1 (L) 0.3 - 6.2 %    Basophil % 1.1 0.0 - 1.5 %    Immature Grans % 0.3 0.0 - 0.5 %    Neutrophils, Absolute 5.51 1.70 - 7.00 10*3/mm3    Lymphocytes, Absolute 1.72 0.70 - 3.10 10*3/mm3    Monocytes, Absolute 0.57 0.10 - 0.90  10*3/mm3    Eosinophils, Absolute 0.01 0.00 - 0.40 10*3/mm3    Basophils, Absolute 0.09 0.00 - 0.20 10*3/mm3    Immature Grans, Absolute 0.02 0.00 - 0.05 10*3/mm3    nRBC 0.0 0.0 - 0.2 /100 WBC   Pregnancy, Urine - Urine, Clean Catch    Collection Time: 11/24/24  5:45 PM    Specimen: Urine, Clean Catch   Result Value Ref Range    HCG, Urine QL Negative Negative   Urinalysis With Culture If Indicated - Urine, Clean Catch    Collection Time: 11/24/24  5:45 PM    Specimen: Urine, Clean Catch   Result Value Ref Range    Color, UA Yellow Yellow, Straw    Appearance, UA Clear Clear    pH, UA 6.0 5.0 - 8.0    Specific Gravity, UA 1.020 1.005 - 1.030    Glucose, UA Negative Negative    Ketones, UA 80 mg/dL (3+) (A) Negative    Bilirubin, UA Negative Negative    Blood, UA Negative Negative    Protein, UA Negative Negative    Leuk Esterase, UA Negative Negative    Nitrite, UA Negative Negative    Urobilinogen, UA 0.2 E.U./dL 0.2 - 1.0 E.U./dL   Urine Drug Screen - Urine, Clean Catch    Collection Time: 11/24/24  5:45 PM    Specimen: Urine, Clean Catch   Result Value Ref Range    THC, Screen, Urine Negative Negative    Phencyclidine (PCP), Urine Negative Negative    Cocaine Screen, Urine Negative Negative    Methamphetamine, Ur Negative Negative    Opiate Screen Negative Negative    Amphetamine Screen, Urine Positive (A) Negative    Benzodiazepine Screen, Urine Positive (A) Negative    Tricyclic Antidepressants Screen Negative Negative    Methadone Screen, Urine Negative Negative    Barbiturates Screen, Urine Negative Negative    Oxycodone Screen, Urine Negative Negative    Buprenorphine, Screen, Urine Negative Negative   Fentanyl, Urine - Urine, Clean Catch    Collection Time: 11/24/24  5:45 PM    Specimen: Urine, Clean Catch   Result Value Ref Range    Fentanyl, Urine Negative Negative   Blood Gas, Venous With Co-Ox    Collection Time: 11/24/24  6:08 PM    Specimen: Venous Blood   Result Value Ref Range    Site OTHER     pH,  Venous 7.441 (H) 7.320 - 7.420 pH Units    pCO2, Venous 33.0 (L) 40.0 - 50.0 mm Hg    pO2, Venous 32.6 30.0 - 50.0 mm Hg    HCO3, Venous 22.5 22.0 - 28.0 mmol/L    Base Excess, Venous -0.9 (L) 0.0 - 2.0 mmol/L    O2 Saturation, Venous 62.1 45.0 - 75.0 %    Oxyhemoglobin Venous 60.9 40.0 - 70.0 %    Methemoglobin Venous 0.6 0.0 - 3.0 %    Carboxyhemoglobin Venous 1.2 0.0 - 5.0 %    Barometric Pressure for Blood Gas 733 mmHg    Modality Room Air     Ventilator Mode NA     Collected by RN    Comprehensive Metabolic Panel    Collection Time: 11/24/24  8:24 PM    Specimen: Blood   Result Value Ref Range    Glucose 337 (H) 65 - 99 mg/dL    BUN 4 (L) 6 - 20 mg/dL    Creatinine 0.59 0.57 - 1.00 mg/dL    Sodium 129 (L) 136 - 145 mmol/L    Potassium 3.9 3.5 - 5.2 mmol/L    Chloride 96 (L) 98 - 107 mmol/L    CO2 18.8 (L) 22.0 - 29.0 mmol/L    Calcium 7.2 (L) 8.6 - 10.5 mg/dL    Total Protein 5.9 (L) 6.0 - 8.5 g/dL    Albumin 3.6 3.5 - 5.2 g/dL    ALT (SGPT) 29 1 - 33 U/L    AST (SGOT) 40 (H) 1 - 32 U/L    Alkaline Phosphatase 56 39 - 117 U/L    Total Bilirubin 0.9 0.0 - 1.2 mg/dL    Globulin 2.3 gm/dL    A/G Ratio 1.6 g/dL    BUN/Creatinine Ratio 6.8 (L) 7.0 - 25.0    Anion Gap 14.2 5.0 - 15.0 mmol/L    eGFR 126.9 >60.0 mL/min/1.73       If labs were ordered, I independently reviewed the results and considered them in treating the patient.        RADIOLOGY  No Radiology Exams Resulted Within Past 24 Hours        PROCEDURES    Procedures    Interpretations    O2 Sat: The patients oxygen saturation was 98% on Room Air.  This was independently interpreted by me as Normal    EKG: I reviewed and independently interpreted the EKG as sinus tachycardia with a rate of 131, no ST segment elevation.    Cardiac Monitoring: I reviewed and independently interpreted the Rhythm Strip as Sinus Tachycardia rate of 129    MEDICATIONS GIVEN IN ER    Medications   sodium chloride 0.9 % flush 10 mL (has no administration in time range)    chlordiazePOXIDE (LIBRIUM) capsule 25 mg (has no administration in time range)   sodium chloride 0.9 % bolus 1,000 mL (0 mL Intravenous Stopped 11/24/24 1856)   diazePAM (VALIUM) injection 5 mg (5 mg Intravenous Given 11/24/24 1720)   dextrose 5 % and sodium chloride 0.9 % infusion (0 mL/hr Intravenous Stopped 11/24/24 1959)   ondansetron (ZOFRAN) injection 4 mg (4 mg Intravenous Given 11/24/24 1929)         MEDICAL DECISION MAKING, PROGRESS, and CONSULTS    All labs, if obtained, have been independently reviewed by me.  All radiology studies, if obtained, have been reviewed by me and the radiologist dictating the report.  All EKG's, if obtained, have been independently viewed and interpreted by me      Discussion below represents my analysis of pertinent findings related to patient's condition, differential diagnosis, treatment plan and final disposition.      Differential diagnosis:    Alcohol withdrawal, alcohol intoxication, lecture abnormality, dehydration, anxiety    Additional Sources:  None      Orders placed during this visit:  Orders Placed This Encounter   Procedures    Comprehensive Metabolic Panel    Pregnancy, Urine - Urine, Clean Catch    Urinalysis With Culture If Indicated - Urine, Clean Catch    Acetaminophen Level    Ethanol    Urine Drug Screen - Urine, Clean Catch    Salicylate Level    Magnesium    CBC Auto Differential    Blood Gas, Venous -With Co-Ox Panel: Yes    Fentanyl, Urine - Urine, Clean Catch    Blood Gas, Venous With Co-Ox    Comprehensive Metabolic Panel    Monitor Blood Pressure    ECG 12 Lead Tachycardia    Insert Peripheral IV    CBC & Differential         Additional orders considered but not ordered:  None    ED Course:    Consultants:  None    ED Course as of 11/24/24 2137   Sun Nov 24, 2024   1711 EKG interpreted by me, sinus tachycardia with no concerning ST changes noted, rate of 131 QTc of 361 [JE]   1732 Ethanol: <10 [TM]   1803 Ketones, UA(!): 80 mg/dL (3+) [TM]   1811  pH, Venous(!): 7.441 [TM]   2136 Patient feeling much better, gap has closed.  Vitals have improved, tachycardia has improved.  She states she is no longer wishing to drink wishing to have outpatient treatment does not want inpatient.  Discussed case with Dr. Foreman, will give Librium taper. [TM]      ED Course User Index  [JE] Rell Foreman MD  [TM] Shyam Dunham PA-C           After my consideration of clinical presentation and any laboratory/radiology studies obtained, I discussed the findings with the patient/patient representative who is in agreement with the treatment plan and the final disposition. Risks and benefits of discharge were discussed.     AS OF 21:37 EST VITALS:    BP - 147/94  HR - 118  TEMP - 99.8 °F (37.7 °C) (Oral)  O2 SATS - 96%    I reviewed the patients prescription monitoring report if available prior to discharge    DIAGNOSIS  Final diagnoses:   Alcohol use disorder         DISPOSITION  ED Disposition       ED Disposition   Discharge    Condition   Stable    Comment   --                   Please note that portions of this document were completed with voice recognition software.        Shyam Dunham PA-C  11/24/24 2137

## 2024-11-24 NOTE — Clinical Note
[facility] EMERGENCY DEPARTMENT  [address] [address]  Phone: [phone]    Joelle Luna was seen and treated in our emergency department on 11/24/2024.  She may return to work on 11/26/2024.         Thank you for choosing [facility].    Shelia Hidalgo RN

## 2024-11-25 NOTE — CONSULTS
Joelle Luna  1995    Therapist was asked to provide patient with outpatient substance abuse resources. Consult was not ordered.  Therapist provided patient with outpatient substance abuse resources.    Sohan Sorensen [facility]  11/24/2024

## 2024-12-09 ENCOUNTER — OFFICE VISIT (OUTPATIENT)
Dept: PSYCHIATRY | Facility: CLINIC | Age: 29
End: 2024-12-09
Payer: MEDICAID

## 2024-12-09 VITALS
BODY MASS INDEX: 37.93 KG/M2 | SYSTOLIC BLOOD PRESSURE: 112 MMHG | DIASTOLIC BLOOD PRESSURE: 76 MMHG | HEART RATE: 94 BPM | WEIGHT: 236 LBS | HEIGHT: 66 IN | OXYGEN SATURATION: 99 %

## 2024-12-09 DIAGNOSIS — F10.20 ALCOHOL USE DISORDER, SEVERE, DEPENDENCE: Primary | ICD-10-CM

## 2024-12-09 DIAGNOSIS — F33.1 MAJOR DEPRESSIVE DISORDER, RECURRENT EPISODE, MODERATE: ICD-10-CM

## 2024-12-09 DIAGNOSIS — F41.1 GENERALIZED ANXIETY DISORDER: ICD-10-CM

## 2024-12-09 RX ORDER — NALTREXONE HYDROCHLORIDE 50 MG/1
50 TABLET, FILM COATED ORAL DAILY
Qty: 30 TABLET | Refills: 0 | Status: SHIPPED | OUTPATIENT
Start: 2024-12-09

## 2024-12-09 RX ORDER — HYDROCHLOROTHIAZIDE 25 MG/1
25 TABLET ORAL DAILY
COMMUNITY
Start: 2024-10-15 | End: 2025-01-13

## 2024-12-09 RX ORDER — ALBUTEROL SULFATE 90 UG/1
2 AEROSOL, METERED RESPIRATORY (INHALATION)
COMMUNITY
Start: 2024-10-09

## 2024-12-09 RX ORDER — PHENTERMINE HYDROCHLORIDE 37.5 MG/1
37.5 TABLET ORAL
COMMUNITY
Start: 2024-11-12 | End: 2024-12-12

## 2024-12-09 RX ORDER — ANTIARTHRITIC COMBINATION NO.2 900 MG
TABLET ORAL
COMMUNITY
Start: 2024-08-26

## 2024-12-09 NOTE — PROGRESS NOTES
"     New Patient Office Visit        Patient Name: Joelle Luna  : 1995   MRN: [id number]     Referring Provider: Julianna Hale MD    Chief Complaint: Substance use and mental health    History of Present Illness:   Joelle Luna is a 29 y.o. female who is here today for initial evaluation with provider related to substance use and mental health. Initial substance at age 17 with recreational use of opioid pain medication.  Use was sporadic over a period of 8 months.  Denies any further intake since age 17.  At age 19 recreational use of gabapentin began and used regularly for a period of 6 months.  Denies any intake since age 19.  At age 21 use of methamphetamine began.  Use daily for a period of 10 months.  Denies any intake since 21 years old.  After cessation of methamphetamine, alcohol use began.  Use was social on weekends at onset but slowly escalated over time.  Has hit her peak of 1/5 of whiskey daily on and off over the years.  Was able to maintain her sobriety during her pregnancy around age 25.  Use recurred and escalated with postpartum depression.  Has waxed and waned since.  Currently drinking 4-5 shots of whiskey about every other day.  Last intake yesterday.  Tried LSD and marijuana in high school.  Nothing current.  Former cigarette smoker.    Cravings for alcohol are daily and typically triggered by depression and not being comfortable being alone. Previous [facility] treatment includes residential treatment x 6 days 4 years ago.  No MAT other than Librium taper.  Identifies sober support system of her daughters grandmother. Motivated to change as \"I am tired of feeling like shit”.     Has psych history of MDD and NÉSTOR. Today reports symptoms of depressed mood overall, lack of motivation/interest, feelings of guilt, low energy, poor concentration, appetites waxes and wanes, sleep difficulties, and psychomotor retardation at times. Denies thoughts they would be better off dead.  Also " struggles with anxious mood overall, worries all the time, feels on edge/irritable, racing thoughts,  trouble relaxing, and restlessness.  Nightmares on occasion.  Struggles to fall asleep and stay asleep.  Admits high caffeine intake.  Feels anxiety symptoms are more predominant.  Rates anxiety and depression both as a 6-7/10 when sober and 10 out of 10 when she is actively drinking.  Symptoms have been present on and off since childhood and worsening over the last few years.  She is 1 of 4 children and feels she was the only one that her mother was not affectionate with.  Admits significant trauma throughout her childhood and early teenage years.  Does not feel it has been adequately dealt with.  Currently taking Prozac 40 mg every morning and trazodone 150 mg nightly that are somewhat helpful.  Has tried Zoloft and Lexapro in the past that were both not efficacious. Denies prior psychiatric hospitalizations. Denies SI/HI.  Admits AVH with alcohol withdrawal only. +FH of MDD in mother, father. AUD in father, sister.    PMH includes hypertension and prediabetes.  Currently on phentermine for weight loss.  Has a PCP (Dr. Sanjeev Beckford).  Denies Hep C/HIV.  Denies seizure history.  Admits DT history.  No chance of pregnancy.  On OCP and uses condoms.    Currently lives in [place] with her daughter and her daughter's father.  Admits she and her daughter's father have a difficult relationship.  Daughter is 4 years old and they share custody. Highest level of education completed was some college. Currently employed part-time by [employer]. License is active and shares a vehicle.  Denies current legal issues related to substance use.    Triggers: Depression, being alone    Cravings: Daily for alcohol    Relapse Prevention: MAT, CD-IOP, recovery meetings, peer support, psych med management    Urine Drug Screen (today's visit) discussed: N/A, denies any recent kratom or opioid use    UDS Confirmation: 11/24/2024 positive  amphetamine and benzodiazepine on preliminary    SARITA (PDMP) Reviewed for Current/Active Medications:         DSM 5 Alcohol use Disorder Checklist     Diagnostic Criteria   (Substance use disorder requires at least 2 criteria be met within 12 month period)   Meets Criteria          Yes / No  Notes/Supporting Information    Substance often taken in larger amounts or over a longer period than intended.  yes    2.  There is a persistent desire or unsuccessful effort to cut        down or control th substance use.  yes    3.  A great deal of time is spent in activities necessary to        obtain the substance, use the substance, or recover        from its effects.  yes    4.  Craving or a strong desire to use the substance.  yes    5.  Recurrent substance use resulting in failure to fulfill        major role obligations at work, school, or home.  yes    6.  Continued substance use despite having persistent or         recurrent social or interpersonal problems caused or         exacerbated by the effects of the substance.  yes    7.   Important social, occupational or recreational activities        are given up or reduced because of substance use.  yes    8.   Recurrent substance use in situations in which it is        physically hazardous.  no    9.  Continued use despite knowledge of having a         persistent or recurrent physical or psychological         problem that is likely to have been caused or        exacerbated by the substance.  yes    10. * Tolerance, as defined by either of the following:          a. A need for markedly increased amounts of the              Substance to achieve intoxication or desired effect.          b. Markedly diminished effect with continued use of              The same substance.  yes    11.  * Withdrawal, as manifested by either of the following:         a. The characteristic withdrawal for the substance.          b. The same (or closely related) substance is taken to                Relieve or avoid withdrawal symptoms.  yes    **This criterion is not considered to be met for those individuals taking prescriptions opiates solely under medical supervision. **   Severity: Mild: 2-3 symptoms, Moderate: 4-5 symptoms, Severe: 6 or more symptoms.          Past Surgical History:  Past Surgical History:   Procedure Laterality Date    TONSILLECTOMY         Problem List:  There is no problem list on file for this patient.      Allergy:   No Known Allergies     Current Medications:   Current Outpatient Medications   Medication Sig Dispense Refill    Drospirenone 4 MG tablet Take 1 tablet by mouth Daily.      FLUoxetine (PROzac) 40 MG capsule Take 1 capsule by mouth Daily.      hydroCHLOROthiazide 25 MG tablet Take 1 tablet by mouth Daily.      Multiple Vitamins-Minerals (Womens Multi) capsule       phentermine (ADIPEX-P) 37.5 MG tablet Take 1 tablet by mouth.      traZODone (DESYREL) 100 MG tablet Take 1 tablet by mouth. (Patient taking differently: Take 1.5 tablets by mouth Every Night.)      Ventolin  (90 Base) MCG/ACT inhaler 2 puffs.      naltrexone (DEPADE) 50 MG tablet Take 1 tablet by mouth Daily. 30 tablet 0     No current facility-administered medications for this visit.       Past Medical History:  Past Medical History:   Diagnosis Date    Alcohol abuse 2017    Alcoholism 2018    Anxiety 2014    Depression 2013    Panic disorder     Substance abuse     Withdrawal symptoms, alcohol        Social History:  Social History     Socioeconomic History    Marital status: Single   Tobacco Use    Smoking status: Never     Passive exposure: Never    Smokeless tobacco: Never   Vaping Use    Vaping status: Former    Substances: Nicotine    Devices: Disposable   Substance and Sexual Activity    Alcohol use: Not Currently     Alcohol/week: 10.0 standard drinks of alcohol     Types: 10 Shots of liquor per week    Drug use: Never    Sexual activity: Yes     Partners: Male     Birth control/protection:  Birth control pill       Family History:  Family History   Problem Relation Age of Onset    Anxiety disorder Mother     Bipolar disorder Mother     Depression Mother     Alcohol abuse Father     Depression Father     ADD / ADHD Sister     OCD Sister     Alcohol abuse Sister     Bipolar disorder Sister     Depression Sister     Paranoid behavior Brother          Subjective      Review of Systems:   Review of Systems   Constitutional:  Positive for fatigue. Negative for chills and fever.   Respiratory:  Negative for shortness of breath.    Cardiovascular:  Negative for chest pain.   Gastrointestinal:  Negative for abdominal pain.   Skin:  Negative for skin lesions.   Neurological:  Negative for seizures and confusion.   Psychiatric/Behavioral:  Positive for decreased concentration, sleep disturbance, depressed mood and stress. Negative for hallucinations and suicidal ideas. The patient is nervous/anxious.        PHQ-9 Depression Screening  Little interest or pleasure in doing things? Several days   Feeling down, depressed, or hopeless? Almost all   Trouble falling or staying asleep, or sleeping too much? Almost all   Feeling tired or having little energy? Several days   Poor appetite or overeating? Several days   Feeling bad about yourself - or that you are a failure or have let yourself or your family down? Almost all   Trouble concentrating on things, such as reading the newspaper or watching television? Almost all   Moving or speaking so slowly that other people could have noticed? Or the opposite - being so fidgety or restless that you have been moving around a lot more than usual? Almost all   Thoughts that you would be better off dead, or of hurting yourself in some way? Not at all   PHQ-9 Total Score 18   If you checked off any problems, how difficult have these problems made it for you to do your work, take care of things at home, or get along with other people? Extremely difficult      NÉSTOR-7 Score:   Feeling  nervous, anxious or on edge: Nearly every day  Not being able to stop or control worrying: Nearly every day  Worrying too much about different things: Nearly every day  Trouble Relaxing: More than half the days  Being so restless that it is hard to sit still: More than half the days  Feeling afraid as if something awful might happen: Nearly every day  Becoming easily annoyed or irritable: Nearly every day  NÉSTOR 7 Total Score: 19  If you checked any problems, how difficult have these problems made it for you to do your work, take care of things at home, or get along with other people: Extremely difficult    Patient History:   The following portions of the patient's history were reviewed and updated as appropriate: allergies, current medications, past family history, past medical history, past social history, past surgical history and problem list.     Social:  Social History     Socioeconomic History    Marital status: Single   Tobacco Use    Smoking status: Never     Passive exposure: Never    Smokeless tobacco: Never   Vaping Use    Vaping status: Former    Substances: Nicotine    Devices: Disposable   Substance and Sexual Activity    Alcohol use: Not Currently     Alcohol/week: 10.0 standard drinks of alcohol     Types: 10 Shots of liquor per week    Drug use: Never    Sexual activity: Yes     Partners: Male     Birth control/protection: Birth control pill       Medications:     Current Outpatient Medications:     Drospirenone 4 MG tablet, Take 1 tablet by mouth Daily., Disp: , Rfl:     FLUoxetine (PROzac) 40 MG capsule, Take 1 capsule by mouth Daily., Disp: , Rfl:     hydroCHLOROthiazide 25 MG tablet, Take 1 tablet by mouth Daily., Disp: , Rfl:     Multiple Vitamins-Minerals (Womens Multi) capsule, , Disp: , Rfl:     phentermine (ADIPEX-P) 37.5 MG tablet, Take 1 tablet by mouth., Disp: , Rfl:     traZODone (DESYREL) 100 MG tablet, Take 1 tablet by mouth. (Patient taking differently: Take 1.5 tablets by mouth Every  "Night.), Disp: , Rfl:     Ventolin  (90 Base) MCG/ACT inhaler, 2 puffs., Disp: , Rfl:     naltrexone (DEPADE) 50 MG tablet, Take 1 tablet by mouth Daily., Disp: 30 tablet, Rfl: 0    Objective     Physical Exam  Vitals reviewed.   Constitutional:       General: She is not in acute distress.     Appearance: She is well-developed. She is not ill-appearing.   Pulmonary:      Effort: No respiratory distress.   Neurological:      Mental Status: She is alert and oriented to person, place, and time.      Gait: Gait normal.   Psychiatric:         Attention and Perception: Attention normal.         Mood and Affect: Mood and affect normal.         Speech: Speech normal.         Behavior: Behavior normal. Behavior is cooperative.         Thought Content: Thought content is not paranoid or delusional. Thought content does not include homicidal or suicidal ideation. Thought content does not include homicidal or suicidal plan.         Cognition and Memory: Cognition and memory normal.         Vital Signs:   Vitals:    12/09/24 1102   BP: 112/76   Pulse: 94   SpO2: 99%   Weight: 107 kg (236 lb)   Height: 167.6 cm (66\")     Body mass index is 38.09 kg/m².     Mental Status Exam:   Hygiene:   good  Cooperation:  Cooperative  Eye Contact:  Good  Psychomotor Behavior:  Appropriate  Affect:  Full range  Mood: normal  Speech:  Normal  Thought Process:  Goal directed  Thought Content:  Normal  Suicidal:  None  Homicidal:  None  Hallucinations:  None  Delusion:  None  Memory:  Intact  Orientation:  Person, Place, Time, and Situation  Reliability:  good  Insight:  Fair  Judgement:  Fair  Impulse Control:  Fair    Assessment / Plan      -This is my initial evaluation with Summer. Discussed treatment options with patient (residential, inpatient detox, outpatient detox, IOP, counseling, meetings, MAT).  Patient declines detox options today. Does not feel she will have any issues stopping alcohol intake.  Discussed red flag signs and " symptoms and is agreeable to go to ER or call 911 with any issues.  Based on self-reported substance use history and current symptom burden, CD-IOP has been advised and screener appointment scheduled with Jeff Love LCSW to complete the intake process.   -Discussed MAT treatment options. Patient desires to start medication assisted treatment with naltrexone. Patient has agreed to participate in all aspects of treatment including follow-up appointments, counseling, group visits, drug testing, lab work, and other requirements.  Will start naltrexone 50 mg daily for alcohol use disorder.  Denies any recent kratom or opioid intake.  Discussed adverse effects of medication and when to call/RTC.  LFTs from 11/24/2024 appropriate for use.  Will redraw in the next month or 2.  -Encouraged to take part in and remain active in 12 Step Recovery Meetings, IOP, and/or 1:1 therapy/counseling and to establish/maintain an active relationship with a recovery sponsor as part of their long-term recovery care. Recovery meeting list for [facility]. Discussed other recovery related materials.  -Instructed to go to lab today to complete baseline UDS. Agreeable to continued monitoring and will request confirmations with levels on all preliminary positive results for patient safety, medication compliance, adherence to treatment plan, toxicity monitoring (when applicable), and planning of future care.   -BLAKE signed for [facility] and referral placed for peer support.  Client TCM.  -Narcan sample declined.  -Will continue current psych medication regimen of fluoxetine 40 mg every morning for MDD, NÉSTOR and trazodone 150 mg nightly for MDD, insomnia.  We discussed the limitations of current medication regimen and the presence of continued alcohol use.  We will see how mood improves with sobriety.  Encouraged utilization of good sleep hygiene.  Has high caffeine intake daily in addition to phentermine use.  Would like to schedule with  individual therapy as well.  -Glucose elevated in last CMP.  Encouraged her to follow-up with primary care.  She has an appointment tomorrow for follow-up.  Labs printed and given to her at visit.      Assessment & Plan   Problems Addressed this Visit    None  Visit Diagnoses       Alcohol use disorder, severe, dependence    -  Primary    Relevant Medications    naltrexone (DEPADE) 50 MG tablet    Major depressive disorder, recurrent episode, moderate        Relevant Medications    phentermine (ADIPEX-P) 37.5 MG tablet    Generalized anxiety disorder        Relevant Medications    phentermine (ADIPEX-P) 37.5 MG tablet          Diagnoses         Codes Comments    Alcohol use disorder, severe, dependence    -  Primary ICD-10-CM: F10.20  ICD-9-CM: 303.90     Major depressive disorder, recurrent episode, moderate     ICD-10-CM: F33.1  ICD-9-CM: 296.32     Generalized anxiety disorder     ICD-10-CM: F41.1  ICD-9-CM: 300.02             Visit Diagnoses:    ICD-10-CM ICD-9-CM   1. Alcohol use disorder, severe, dependence  F10.20 303.90   2. Major depressive disorder, recurrent episode, moderate  F33.1 296.32   3. Generalized anxiety disorder  F41.1 300.02       PLAN:  Safety: No acute safety concerns  Risk Assessment: Risk of self-harm acutely is low. Risk of self-harm chronically is also low, but could be further elevated in the event of treatment noncompliance and/or AODA.    TREATMENT PLAN: Continue supportive psychotherapy efforts and medications as indicated. Treatment and medication options discussed during today's visit. Patient acknowledged and verbally consented to continue with current treatment plan and was educated on the importance of compliance with treatment and follow-up appointments.    GOALS:  Short Term Goals: Patient will be compliant with medication, and patient will have no significant medication related side effects.  Patient will be engaged in psychotherapy as indicated.  Patient will report  subjective improvement of symptoms.  Long term goals: To stabilize mood and treat/improve subjective symptoms, the patient will stay out of the hospital, the patient will be at an optimal level of functioning, and the patient will take all medications as prescribed.  The patient/guardian verbalized understanding and agreement with goals that were mutually set.    MEDICATION ISSUES:  SARITA reviewed as expected.  Discussed medication options and treatment plan of prescribed medication as well as the risks, benefits, and side effects including potential falls, possible impaired driving and metabolic adversities among others. Patient is agreeable to call the office with any worsening of symptoms or onset of side effects. Patient is agreeable to call 911 or go to the nearest ER should he/she begin having SI/HI. No medication side effects or related complaints today.       TOBACCO USE:  Never smoker    I advised Joelle Luna of the risks of tobacco use.     MEDS ORDERED DURING VISIT:  New Medications Ordered This Visit   Medications    naltrexone (DEPADE) 50 MG tablet     Sig: Take 1 tablet by mouth Daily.     Dispense:  30 tablet     Refill:  0       Return in about 2 weeks (around 12/23/2024) for Follow Up Medication.           This document has been electronically signed by DEONTE Garcia  December 10, 2024 08:22 EST      Part of this note may be an electronic transcription/translation of spoken language to printed text using the Dragon Dictation System.

## 2025-04-20 ENCOUNTER — HOSPITAL ENCOUNTER (EMERGENCY)
Facility: HOSPITAL | Age: 30
Discharge: HOME | End: 2025-04-20
Attending: STUDENT IN AN ORGANIZED HEALTH CARE EDUCATION/TRAINING PROGRAM | Admitting: STUDENT IN AN ORGANIZED HEALTH CARE EDUCATION/TRAINING PROGRAM
Payer: MEDICAID

## 2025-04-20 VITALS
RESPIRATION RATE: 18 BRPM | BODY MASS INDEX: 35.71 KG/M2 | TEMPERATURE: 98 F | WEIGHT: 222.2 LBS | SYSTOLIC BLOOD PRESSURE: 127 MMHG | HEIGHT: 66 IN | DIASTOLIC BLOOD PRESSURE: 97 MMHG | HEART RATE: 118 BPM | OXYGEN SATURATION: 98 %

## 2025-04-20 DIAGNOSIS — E87.6 HYPOKALEMIA: ICD-10-CM

## 2025-04-20 DIAGNOSIS — F10.10 CHRONIC ALCOHOL ABUSE: ICD-10-CM

## 2025-04-20 DIAGNOSIS — F10.930 ALCOHOL WITHDRAWAL SYNDROME WITHOUT COMPLICATION: Primary | ICD-10-CM

## 2025-04-20 LAB
ALBUMIN SERPL-MCNC: 3.9 G/DL (ref 3.5–5.2)
ALBUMIN/GLOB SERPL: 1 G/DL
ALP SERPL-CCNC: 87 U/L (ref 39–117)
ALT SERPL W P-5'-P-CCNC: 56 U/L (ref 1–33)
ANION GAP SERPL CALCULATED.3IONS-SCNC: 19.5 MMOL/L (ref 5–15)
AST SERPL-CCNC: 64 U/L (ref 1–32)
BASOPHILS # BLD AUTO: 0.06 10*3/MM3 (ref 0–0.2)
BASOPHILS NFR BLD AUTO: 1 % (ref 0–1.5)
BILIRUB SERPL-MCNC: 0.5 MG/DL (ref 0–1.2)
BUN SERPL-MCNC: 6 MG/DL (ref 6–20)
BUN/CREAT SERPL: 7.9 (ref 7–25)
CALCIUM SPEC-SCNC: 8.8 MG/DL (ref 8.6–10.5)
CHLORIDE SERPL-SCNC: 97 MMOL/L (ref 98–107)
CO2 SERPL-SCNC: 24.5 MMOL/L (ref 22–29)
CREAT SERPL-MCNC: 0.76 MG/DL (ref 0.57–1)
DEPRECATED RDW RBC AUTO: 48.7 FL (ref 37–54)
EGFRCR SERPLBLD CKD-EPI 2021: 110.3 ML/MIN/1.73
EOSINOPHIL # BLD AUTO: 0.05 10*3/MM3 (ref 0–0.4)
EOSINOPHIL NFR BLD AUTO: 0.8 % (ref 0.3–6.2)
ERYTHROCYTE [DISTWIDTH] IN BLOOD BY AUTOMATED COUNT: 13.4 % (ref 12.3–15.4)
GLOBULIN UR ELPH-MCNC: 3.8 GM/DL
GLUCOSE SERPL-MCNC: 139 MG/DL (ref 65–99)
HCG SERPL QL: NEGATIVE
HCT VFR BLD AUTO: 52.3 % (ref 34–46.6)
HGB BLD-MCNC: 18.2 G/DL (ref 12–15.9)
IMM GRANULOCYTES # BLD AUTO: 0.01 10*3/MM3 (ref 0–0.05)
IMM GRANULOCYTES NFR BLD AUTO: 0.2 % (ref 0–0.5)
LYMPHOCYTES # BLD AUTO: 2.57 10*3/MM3 (ref 0.7–3.1)
LYMPHOCYTES NFR BLD AUTO: 41.4 % (ref 19.6–45.3)
MCH RBC QN AUTO: 34.2 PG (ref 26.6–33)
MCHC RBC AUTO-ENTMCNC: 34.8 G/DL (ref 31.5–35.7)
MCV RBC AUTO: 98.3 FL (ref 79–97)
MONOCYTES # BLD AUTO: 0.61 10*3/MM3 (ref 0.1–0.9)
MONOCYTES NFR BLD AUTO: 9.8 % (ref 5–12)
NEUTROPHILS NFR BLD AUTO: 2.91 10*3/MM3 (ref 1.7–7)
NEUTROPHILS NFR BLD AUTO: 46.8 % (ref 42.7–76)
NRBC BLD AUTO-RTO: 0 /100 WBC (ref 0–0.2)
PLATELET # BLD AUTO: 400 10*3/MM3 (ref 140–450)
PMV BLD AUTO: 9 FL (ref 6–12)
POTASSIUM SERPL-SCNC: 2.9 MMOL/L (ref 3.5–5.2)
PROT SERPL-MCNC: 7.7 G/DL (ref 6–8.5)
RBC # BLD AUTO: 5.32 10*6/MM3 (ref 3.77–5.28)
SODIUM SERPL-SCNC: 141 MMOL/L (ref 136–145)
WBC NRBC COR # BLD AUTO: 6.21 10*3/MM3 (ref 3.4–10.8)

## 2025-04-20 PROCEDURE — 25810000003 SODIUM CHLORIDE 0.9 % SOLUTION: Performed by: STUDENT IN AN ORGANIZED HEALTH CARE EDUCATION/TRAINING PROGRAM

## 2025-04-20 PROCEDURE — 84703 CHORIONIC GONADOTROPIN ASSAY: CPT | Performed by: STUDENT IN AN ORGANIZED HEALTH CARE EDUCATION/TRAINING PROGRAM

## 2025-04-20 PROCEDURE — 96365 THER/PROPH/DIAG IV INF INIT: CPT

## 2025-04-20 PROCEDURE — 80053 COMPREHEN METABOLIC PANEL: CPT | Performed by: STUDENT IN AN ORGANIZED HEALTH CARE EDUCATION/TRAINING PROGRAM

## 2025-04-20 PROCEDURE — 96375 TX/PRO/DX INJ NEW DRUG ADDON: CPT

## 2025-04-20 PROCEDURE — 25010000002 LORAZEPAM PER 2 MG: Performed by: STUDENT IN AN ORGANIZED HEALTH CARE EDUCATION/TRAINING PROGRAM

## 2025-04-20 PROCEDURE — 25010000002 THIAMINE HCL 200 MG/2ML SOLUTION: Performed by: STUDENT IN AN ORGANIZED HEALTH CARE EDUCATION/TRAINING PROGRAM

## 2025-04-20 PROCEDURE — 93005 ELECTROCARDIOGRAM TRACING: CPT | Performed by: STUDENT IN AN ORGANIZED HEALTH CARE EDUCATION/TRAINING PROGRAM

## 2025-04-20 PROCEDURE — 25010000002 FOLIC ACID 5 MG/ML SOLUTION 10 ML VIAL: Performed by: STUDENT IN AN ORGANIZED HEALTH CARE EDUCATION/TRAINING PROGRAM

## 2025-04-20 PROCEDURE — 85025 COMPLETE CBC W/AUTO DIFF WBC: CPT | Performed by: STUDENT IN AN ORGANIZED HEALTH CARE EDUCATION/TRAINING PROGRAM

## 2025-04-20 PROCEDURE — 99284 EMERGENCY DEPT VISIT MOD MDM: CPT | Performed by: STUDENT IN AN ORGANIZED HEALTH CARE EDUCATION/TRAINING PROGRAM

## 2025-04-20 RX ORDER — LORAZEPAM 2 MG/ML
4 INJECTION INTRAMUSCULAR ONCE
Status: COMPLETED | OUTPATIENT
Start: 2025-04-20 | End: 2025-04-20

## 2025-04-20 RX ORDER — THIAMINE HYDROCHLORIDE 100 MG/ML
200 INJECTION, SOLUTION INTRAMUSCULAR; INTRAVENOUS ONCE
Status: COMPLETED | OUTPATIENT
Start: 2025-04-20 | End: 2025-04-20

## 2025-04-20 RX ORDER — POTASSIUM CHLORIDE 1500 MG/1
40 TABLET, EXTENDED RELEASE ORAL DAILY
Status: DISCONTINUED | OUTPATIENT
Start: 2025-04-20 | End: 2025-04-21 | Stop reason: HOSPADM

## 2025-04-20 RX ORDER — LORAZEPAM 2 MG/ML
4 INJECTION INTRAMUSCULAR ONCE
Status: DISCONTINUED | OUTPATIENT
Start: 2025-04-20 | End: 2025-04-20

## 2025-04-20 RX ORDER — SODIUM CHLORIDE 0.9 % (FLUSH) 0.9 %
10 SYRINGE (ML) INJECTION AS NEEDED
Status: DISCONTINUED | OUTPATIENT
Start: 2025-04-20 | End: 2025-04-21 | Stop reason: HOSPADM

## 2025-04-20 RX ORDER — SODIUM CHLORIDE 9 MG/ML
1000 INJECTION, SOLUTION INTRAVENOUS ONCE
Status: COMPLETED | OUTPATIENT
Start: 2025-04-20 | End: 2025-04-20

## 2025-04-20 RX ADMIN — LORAZEPAM 4 MG: 2 INJECTION INTRAMUSCULAR; INTRAVENOUS at 19:24

## 2025-04-20 RX ADMIN — THIAMINE HYDROCHLORIDE 200 MG: 100 INJECTION, SOLUTION INTRAMUSCULAR; INTRAVENOUS at 19:24

## 2025-04-20 RX ADMIN — POTASSIUM CHLORIDE 40 MEQ: 1500 TABLET, EXTENDED RELEASE ORAL at 21:24

## 2025-04-20 RX ADMIN — SODIUM CHLORIDE 1000 ML: 9 INJECTION, SOLUTION INTRAVENOUS at 19:24

## 2025-04-20 RX ADMIN — FOLIC ACID 1 MG: 5 INJECTION, SOLUTION INTRAMUSCULAR; INTRAVENOUS; SUBCUTANEOUS at 19:56

## 2025-04-20 NOTE — ED PROVIDER NOTES
[facility]  Emergency Department Encounter  Emergency Medicine Physician Note       Pt Name: Joelle Luna  MRN: [id number]  Pt :   1995  Room Number:  CDU3/03  Date of encounter:  2025  PCP: Julianna Hale MD  ED Physician: Robert Dietrich DO    HPI:  Joelle Luna is a 29 y.o. female who presents to the ED with chief complaint of alcohol withdrawal.  Symptoms include tremors, anxiety, nausea, and fast heart rate.  Last drink was approximately 1 hour prior to arrival.  She drinks approximately one fifth of vodka daily.  Duration of symptoms constant.  No modifying factors.  No other medical complaints.  Denies SI or HI.  Denies recreational drug use.  No history of withdrawal seizures.    PAST MEDICAL HISTORY  Past Medical History:   Diagnosis Date    Alcohol abuse 2017    Alcoholism 2018    Anxiety 2014    Depression 2013    Panic disorder     Substance abuse     Withdrawal symptoms, alcohol      Current Outpatient Medications   Medication Instructions    Drospirenone 4 mg, Daily    FLUoxetine (PROZAC) 40 mg, Daily    hydroCHLOROthiazide 25 mg, Daily    Multiple Vitamins-Minerals (Womens Multi) capsule     naltrexone (DEPADE) 50 mg, Oral, Daily    traZODone (DESYREL) 100 mg    Ventolin  (90 Base) MCG/ACT inhaler 2 puffs      PAST SURGICAL HISTORY  Past Surgical History:   Procedure Laterality Date    TONSILLECTOMY         FAMILY HISTORY  Family History   Problem Relation Age of Onset    Anxiety disorder Mother     Bipolar disorder Mother     Depression Mother     Alcohol abuse Father     Depression Father     ADD / ADHD Sister     OCD Sister     Alcohol abuse Sister     Bipolar disorder Sister     Depression Sister     Paranoid behavior Brother        SOCIAL HISTORY  Social History     Socioeconomic History    Marital status: Single   Tobacco Use    Smoking status: Never     Passive exposure: Never    Smokeless tobacco: Never   Vaping Use    Vaping status: Former     Substances: Nicotine    Devices: Disposable   Substance and Sexual Activity    Alcohol use: Not Currently     Alcohol/week: 10.0 standard drinks of alcohol     Types: 10 Shots of liquor per week    Drug use: Never    Sexual activity: Yes     Partners: Male     Birth control/protection: Birth control pill     ALLERGIES  Patient has no known allergies.    REVIEW OF SYSTEMS  All systems reviewed and negative except for those discussed in HPI.     PHYSICAL EXAM  ED Triage Vitals [04/20/25 1753]   Temp Heart Rate Resp BP SpO2   98 °F (36.7 °C) (!) 154 18 (!) 165/116 95 %      Temp src Heart Rate Source Patient Position BP Location FiO2 (%)   Oral Monitor -- Left arm --     I have reviewed the triage vital signs and nursing notes.    General: Alert.  Nontoxic appearance.  No acute distress.  Head: Normocephalic.  Atraumatic.  Eyes: No scleral icterus.  ENT: Moist mucous membranes.  Tongue fasciculations.+  Cardiovascular: Tachycardic.  Regular rhythm.  No murmurs.  No rubs.  2+ distal pulses bilaterally.  Respiratory: No wheezing. No rales.  No rhonchi.  GI: Abdomen is soft.  Nondistended.  Nontender to palpation.  No rebound.  No guarding.  No CVA tenderness.  MSK: Moves all 4 extremities.  Neurologic: Oriented x 3.  No focal deficits. + Resting tremor bilateral upper extremities.  Skin: No edema. No erythema. No pallor. No cyanosis.  Psych: Anxious mood and affect.  Denies SI or HI.    LAB RESULTS  Recent Results (from the past 24 hours)   Comprehensive Metabolic Panel    Collection Time: 04/20/25  7:07 PM    Specimen: Blood   Result Value Ref Range    Glucose 139 (H) 65 - 99 mg/dL    BUN 6 6 - 20 mg/dL    Creatinine 0.76 0.57 - 1.00 mg/dL    Sodium 141 136 - 145 mmol/L    Potassium 2.9 (L) 3.5 - 5.2 mmol/L    Chloride 97 (L) 98 - 107 mmol/L    CO2 24.5 22.0 - 29.0 mmol/L    Calcium 8.8 8.6 - 10.5 mg/dL    Total Protein 7.7 6.0 - 8.5 g/dL    Albumin 3.9 3.5 - 5.2 g/dL    ALT (SGPT) 56 (H) 1 - 33 U/L    AST (SGOT) 64 (H)  1 - 32 U/L    Alkaline Phosphatase 87 39 - 117 U/L    Total Bilirubin 0.5 0.0 - 1.2 mg/dL    Globulin 3.8 gm/dL    A/G Ratio 1.0 g/dL    BUN/Creatinine Ratio 7.9 7.0 - 25.0    Anion Gap 19.5 (H) 5.0 - 15.0 mmol/L    eGFR 110.3 >60.0 mL/min/1.73   hCG, Serum, Qualitative    Collection Time: 04/20/25  7:07 PM    Specimen: Blood   Result Value Ref Range    HCG Qualitative Negative Negative   CBC Auto Differential    Collection Time: 04/20/25  7:07 PM    Specimen: Blood   Result Value Ref Range    WBC 6.21 3.40 - 10.80 10*3/mm3    RBC 5.32 (H) 3.77 - 5.28 10*6/mm3    Hemoglobin 18.2 (H) 12.0 - 15.9 g/dL    Hematocrit 52.3 (H) 34.0 - 46.6 %    MCV 98.3 (H) 79.0 - 97.0 fL    MCH 34.2 (H) 26.6 - 33.0 pg    MCHC 34.8 31.5 - 35.7 g/dL    RDW 13.4 12.3 - 15.4 %    RDW-SD 48.7 37.0 - 54.0 fl    MPV 9.0 6.0 - 12.0 fL    Platelets 400 140 - 450 10*3/mm3    Neutrophil % 46.8 42.7 - 76.0 %    Lymphocyte % 41.4 19.6 - 45.3 %    Monocyte % 9.8 5.0 - 12.0 %    Eosinophil % 0.8 0.3 - 6.2 %    Basophil % 1.0 0.0 - 1.5 %    Immature Grans % 0.2 0.0 - 0.5 %    Neutrophils, Absolute 2.91 1.70 - 7.00 10*3/mm3    Lymphocytes, Absolute 2.57 0.70 - 3.10 10*3/mm3    Monocytes, Absolute 0.61 0.10 - 0.90 10*3/mm3    Eosinophils, Absolute 0.05 0.00 - 0.40 10*3/mm3    Basophils, Absolute 0.06 0.00 - 0.20 10*3/mm3    Immature Grans, Absolute 0.01 0.00 - 0.05 10*3/mm3    nRBC 0.0 0.0 - 0.2 /100 WBC       RADIOLOGY  No Radiology Exams Resulted Within Past 24 Hours    PROCEDURES  Procedures    RISK STRATIFICATION    MEDICAL DECISION MAKING  29 y.o. female with past medical history listed above who presents with alcohol withdrawal symptoms.    Vital signs remarkable for tachycardia, hypertension, otherwise within normal limits.  Pulse ox 95% on room air.    Clinical presentation physical exam consistent with alcohol withdrawal.  No clinical evidence of dehydration, infection, sepsis.    I have discussed the indication, risk, and alternatives of the  following high risk medications: IV Ativan    At least 3 different tests have been ordered on this patient.    Medications administered in ED:  Medications   LORazepam (ATIVAN) injection 4 mg (4 mg Intravenous Given 4/20/25 1924)   sodium chloride 0.9 % infusion 1,000 mL (0 mL Intravenous Stopped 4/20/25 2122)   folic acid 1 mg in sodium chloride 0.9 % 50 mL IVPB (0 mg Intravenous Stopped 4/20/25 2025)   thiamine (B-1) injection 200 mg (200 mg Intravenous Given 4/20/25 1924)     Please see ED course below for my interpretation of the ED workup.  ED Course as of 04/21/25 1102   Sun Apr 20, 2025 2009 I reviewed the labs listed above.     Notable findings are highlighted below.    Old laboratory data was reviewed from the medical records and compared to today's results.   [JS]   2010 Potassium(!): 2.9 [JS]   2010 ALT (SGPT)(!): 56 [JS]   2010 AST (SGOT)(!): 64 [JS]   2024 ECG 12 Lead Tachycardia  EKG per my interpretation sinus tachycardia, rate 109, normal axis, no STEMI, normal QRS QTC intervals.   [JS]   2153 Patient reevaluated.  States symptoms have improved.  Remains mildly tachycardic. Vital signs otherwise stable on room air. Tremors have improved. Patient states that she feels better and requests immediate discharge. Patient tolerated oral intake appropriately.  Has a steady gait and nonslurred speech. Denies suicidal or homicidal ideations. No clinical indication for psychiatric hold or admission.    I discussed the findings of the ED workup with the patient at bedside. I recommended outpatient follow-up with PCP.  Patient was deemed medically stable for discharge with close outpatient follow-up and strict ED return precautions. Patient agreeable with plan and disposition.    Chronic conditions affecting care: None    Social determinants of health impacting treatment or disposition: Alcohol abuse [JS]      ED Course User Index  [JS] Robert Dietrich DO     REPEAT VITAL SIGNS  AS OF 11:02 EDT VITALS:  BP -  127/97  HR - 118  TEMP - 98 °F (36.7 °C) (Oral)  O2 SATS - 98%    DIAGNOSIS  Final diagnoses:   Alcohol withdrawal syndrome without complication   Chronic alcohol abuse   Hypokalemia     DISPOSITION  ED Disposition       ED Disposition   Discharge    Condition   Stable    Comment   --               PATIENT REFERRALS  Julianna Hale MD  [address] [place] [address]  [phone]      PCP. 48 hours.            Please note that portions of this document were completed with voice recognition software.        Robert Dietrich DO  04/21/25 1102

## 2025-04-21 NOTE — DISCHARGE INSTRUCTIONS
Instructions from Dr. Dietrich:    It was a privilege being your ER physician today.    Please follow-up in clinic as we discussed.    Please return to the ER if you experience any worsening symptoms or for any other concerns.